# Patient Record
(demographics unavailable — no encounter records)

---

## 2024-12-05 NOTE — PHYSICAL EXAM
[General Appearance - Alert] : alert [General Appearance - In No Acute Distress] : in no acute distress [General Appearance - Well Nourished] : well nourished [General Appearance - Well Developed] : well developed [Oriented To Time, Place, And Person] : oriented to person, place, and time [Impaired Insight] : insight and judgment were intact [Affect] : the affect was normal [Mood] : the mood was normal [Motor Tone] : muscle tone was normal in all four extremities [Motor Strength] : muscle strength was normal in all four extremities [Balance] : balance was intact [Sclera] : the sclera and conjunctiva were normal [PERRL With Normal Accommodation] : pupils were equal in size, round, reactive to light, with normal accommodation [Extraocular Movements] : extraocular movements were intact [Full Visual Field] : full visual field [Outer Ear] : the ears and nose were normal in appearance [Both Tympanic Membranes Were Examined] : both tympanic membranes were normal [Neck Appearance] : the appearance of the neck was normal [] : no respiratory distress [Heart Rate And Rhythm] : heart rate was normal and rhythm regular [Bowel Sounds] : normal bowel sounds [Abnormal Walk] : normal gait [Skin Color & Pigmentation] : normal skin color and pigmentation

## 2024-12-10 NOTE — REASON FOR VISIT
[Referred By: _________] : Patient was referred by KARLOS [FreeTextEntry1] : ICA occlusion, Cerebral aneurysm

## 2024-12-10 NOTE — REVIEW OF SYSTEMS
[As Noted in HPI] : as noted in HPI [Negative] : Integumentary [de-identified] : Brain fog improving

## 2024-12-10 NOTE — REVIEW OF SYSTEMS
[As Noted in HPI] : as noted in HPI [Negative] : Integumentary [de-identified] : Brain fog improving

## 2024-12-10 NOTE — HISTORY OF PRESENT ILLNESS
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   Patient suffered stroke July 2024. She was at work when suddenly she noticed a glare in her Left eye, blurriness, severe headache, tingling in Left hand, and unable to complete simple tasks. She presented to [facility] ED where full stroke work up completed, found with Left posterior stroke 2/2 Left ICA dissection in setting of FMD. She was discharged with referral to Stroke Neurology and Neurosurgery. 2 weeks later, patient had another episode of Left eye blurry vision, lasting 5 mins. Since the stroke, she has had significant headaches, especially with activity. Stroke Neuro prescribed Gabapentin with good effect as well as Eliquis 5mg. Plan per Stroke Neuro was to start ASA 81mg in Jan 2025, and Neurosurgery recommended surgical intervention for the Left ICA occlusion.   TODAY patient states she is doing well, Brain fog from stroke has noticeably improved.    Soc Hx: Non smoker, occasional EtOH, No known family history of strokes  PCP: none   Stroke Neuro: Dr. Amy Knight Neurosurgery: Dr. Davon Ashraf appt TBD

## 2024-12-10 NOTE — REVIEW OF SYSTEMS
[As Noted in HPI] : as noted in HPI [Negative] : Integumentary [de-identified] : Brain fog improving

## 2024-12-10 NOTE — HISTORY OF PRESENT ILLNESS
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   Patient suffered stroke July 2024. She was at work when suddenly she noticed a glare in her Left eye, blurriness, severe headache, tingling in Left hand, and unable to complete simple tasks. She presented to [facility] ED where full stroke work up completed, found with Left posterior stroke 2/2 Left ICA dissection in setting of FMD. She was discharged with referral to Stroke Neurology and Neurosurgery. 2 weeks later, patient had another episode of Left eye blurry vision, lasting 5 mins. Since the stroke, she has had significant headaches, especially with activity. Stroke Neuro prescribed Gabapentin with good effect as well as Eliquis 5mg. Plan per Stroke Neuro was to start ASA 81mg in Jan 2025, and Neurosurgery recommended surgical intervention for the Left ICA occlusion.   TODAY patient states she is doing well, Brain fog from stroke has noticeably improved.    Soc Hx: Non smoker, occasional EtOH, No known family history of strokes  PCP: none   Stroke Neuro: Dr. Amy Knight Neurosurgery: Dr. Davon Ashraf appt TBD

## 2024-12-10 NOTE — HISTORY OF PRESENT ILLNESS
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   Patient suffered stroke July 2024. She was at work when suddenly she noticed a glare in her Left eye, blurriness, severe headache, tingling in Left hand, and unable to complete simple tasks. She presented to [facility] ED where full stroke work up completed, found with Left posterior stroke 2/2 Left ICA dissection in setting of FMD. She was discharged with referral to Stroke Neurology and Neurosurgery. 2 weeks later, patient had another episode of Left eye blurry vision, lasting 5 mins. Since the stroke, she has had significant headaches, especially with activity. Stroke Neuro prescribed Gabapentin with good effect as well as Eliquis 5mg. Plan per Stroke Neuro was to start ASA 81mg in Jan 2025, and Neurosurgery recommended surgical intervention for the Left ICA occlusion.   TODAY patient states she is doing well, Brain fog from stroke has noticeably improved.    Soc Hx: Non smoker, occasional EtOH, No known family history of strokes  PCP: none   Stroke Neuro: Dr. Amy Knight Neurosurgery: Dr. Davon Ashraf appt TBD

## 2024-12-10 NOTE — ASSESSMENT
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 I explained the indications for observation and imaging surveillance, medical management, and surgery. I discussed the risks, benefits, possible complications and expected outcome related to each treatment option.  In the end, I recommend further imaging before deciding on treatment options. The Right cerebral aneurysm we will continue to monitor  PLAN: - MRA NOVA  - NM SPECT  - discuss results in Neurovascular Conference  - return to Dr. Gentile clinic to review - she should also f/u with Stroke Neurology and Rheumatology in the meantime  The patient was instructed that if they should immediately call 911 or go to the Emergency Department if they experience symptoms of severe thunderclap headache, syncope, unexplained nausea/vomiting, visual changes, seizure-like activity, new weakness or numbness of extremities.   I reviewed and answered all patient questions. Patient verbalizes agreement and understanding with plan of care. Patient verbalizes agreement and understanding with plan of care.   I, Dr. Gentile, personally performed the evaluation and management (E/M) services for this new patient. That E/M includes conducting the initial examination, assessing all conditions, and establishing the plan of care. Today, CHEO Delgado was here to observe my evaluation and management services for this patient to be followed going forward.